# Patient Record
Sex: MALE | Race: OTHER
[De-identification: names, ages, dates, MRNs, and addresses within clinical notes are randomized per-mention and may not be internally consistent; named-entity substitution may affect disease eponyms.]

---

## 2020-02-06 ENCOUNTER — HOSPITAL ENCOUNTER (EMERGENCY)
Dept: HOSPITAL 61 - ER | Age: 30
LOS: 1 days | Discharge: HOME | End: 2020-02-07
Payer: SELF-PAY

## 2020-02-06 VITALS — SYSTOLIC BLOOD PRESSURE: 124 MMHG | DIASTOLIC BLOOD PRESSURE: 71 MMHG

## 2020-02-06 VITALS — WEIGHT: 169.76 LBS | BODY MASS INDEX: 27.28 KG/M2 | HEIGHT: 66 IN

## 2020-02-06 DIAGNOSIS — R41.0: ICD-10-CM

## 2020-02-06 DIAGNOSIS — R42: ICD-10-CM

## 2020-02-06 DIAGNOSIS — Z90.49: ICD-10-CM

## 2020-02-06 DIAGNOSIS — R56.9: Primary | ICD-10-CM

## 2020-02-06 LAB
ALBUMIN SERPL-MCNC: 3.9 G/DL (ref 3.4–5)
ALBUMIN/GLOB SERPL: 0.9 {RATIO} (ref 1–1.7)
ALP SERPL-CCNC: 104 U/L (ref 46–116)
ALT SERPL-CCNC: 77 U/L (ref 16–63)
ANION GAP SERPL CALC-SCNC: 12 MMOL/L (ref 6–14)
AST SERPL-CCNC: 27 U/L (ref 15–37)
BASOPHILS # BLD AUTO: 0.1 X10^3/UL (ref 0–0.2)
BASOPHILS NFR BLD: 1 % (ref 0–3)
BILIRUB SERPL-MCNC: 0.4 MG/DL (ref 0.2–1)
BUN SERPL-MCNC: 12 MG/DL (ref 8–26)
BUN/CREAT SERPL: 12 (ref 6–20)
CALCIUM SERPL-MCNC: 8.6 MG/DL (ref 8.5–10.1)
CHLORIDE SERPL-SCNC: 101 MMOL/L (ref 98–107)
CO2 SERPL-SCNC: 27 MMOL/L (ref 21–32)
CREAT SERPL-MCNC: 1 MG/DL (ref 0.7–1.3)
EOSINOPHIL NFR BLD: 0.1 X10^3/UL (ref 0–0.7)
EOSINOPHIL NFR BLD: 1 % (ref 0–3)
ERYTHROCYTE [DISTWIDTH] IN BLOOD BY AUTOMATED COUNT: 13.6 % (ref 11.5–14.5)
GFR SERPLBLD BASED ON 1.73 SQ M-ARVRAT: 89 ML/MIN
GLOBULIN SER-MCNC: 4.3 G/DL (ref 2.2–3.8)
GLUCOSE SERPL-MCNC: 125 MG/DL (ref 70–99)
HCT VFR BLD CALC: 46.4 % (ref 39–53)
HGB BLD-MCNC: 15.6 G/DL (ref 13–17.5)
LYMPHOCYTES # BLD: 3.1 X10^3/UL (ref 1–4.8)
LYMPHOCYTES NFR BLD AUTO: 25 % (ref 24–48)
MCH RBC QN AUTO: 29 PG (ref 25–35)
MCHC RBC AUTO-ENTMCNC: 34 G/DL (ref 31–37)
MCV RBC AUTO: 85 FL (ref 79–100)
MONO #: 0.8 X10^3/UL (ref 0–1.1)
MONOCYTES NFR BLD: 6 % (ref 0–9)
NEUT #: 8.4 X10^3/UL (ref 1.8–7.7)
NEUTROPHILS NFR BLD AUTO: 68 % (ref 31–73)
PLATELET # BLD AUTO: 296 X10^3/UL (ref 140–400)
POTASSIUM SERPL-SCNC: 3.4 MMOL/L (ref 3.5–5.1)
PROT SERPL-MCNC: 8.2 G/DL (ref 6.4–8.2)
RBC # BLD AUTO: 5.48 X10^6/UL (ref 4.3–5.7)
SODIUM SERPL-SCNC: 140 MMOL/L (ref 136–145)
WBC # BLD AUTO: 12.5 X10^3/UL (ref 4–11)

## 2020-02-06 PROCEDURE — 99285 EMERGENCY DEPT VISIT HI MDM: CPT

## 2020-02-06 PROCEDURE — 80053 COMPREHEN METABOLIC PANEL: CPT

## 2020-02-06 PROCEDURE — 85025 COMPLETE CBC W/AUTO DIFF WBC: CPT

## 2020-02-06 PROCEDURE — 70450 CT HEAD/BRAIN W/O DYE: CPT

## 2020-02-06 PROCEDURE — 83605 ASSAY OF LACTIC ACID: CPT

## 2020-02-06 PROCEDURE — 36415 COLL VENOUS BLD VENIPUNCTURE: CPT

## 2020-02-06 NOTE — PHYS DOC
Past Medical History


Past Medical History:  Seizure


Past Surgical History:  Cholecystectomy


Smoking Status:  Never Smoker


Alcohol Use:  None





Adult General


Chief Complaint


Chief Complaint:  SEIZURE





HPI


HPI





29-year-old male presents to emergency department with a seizure. Patient has no

formal diagnosis of seizure however describes seizure-type activity partially 7 

years ago and never saw physician. Significant other states they were driving 

tonight she was talking to the patient subsequently stopped talking began 

shaking foaming at the mouth. This happened around 8:30 PM. She states he was 

confused afterwards, denies any vomiting nausea. He did complain of some 

dizziness. Patient has been on phentermine a few weeks. She as well describes 

he's been taking Tylenol Motrin for her toothache.  Currently patient is at [facility]. He does describe some tingling of his upper and lower extremities 

however this may be related to phentermine. Patient did bite his tongue.





Review of Systems


Review of Systems





Constitutional: Denies fever or chills []


Respiratory: Denies cough or shortness of breath, bite to tongue []


Cardiovascular: No additional information not addressed in HPI []


GI: Denies abdominal pain, nausea, vomiting, bloody stools or diarrhea []


: Denies dysuria or hematuria []


Musculoskeletal: Denies back pain or joint pain []


Integument: Denies rash or skin lesions []


Neurologic: Denies headache, focal weakness or sensory changes []





All other systems were reviewed and found to be within normal limits, except as 

documented in this note.





Allergies


Allergies





Allergies








Coded Allergies Type Severity Reaction Last Updated Verified


 


  No Known Drug Allergies    20 No











Physical Exam


Physical Exam





Constitutional: Well developed, well nourished, no acute distress, non-toxic 

appearance. []


HENT: Normocephalic, atraumatic, bilateral external ears normal, oropharynx 

moist, evidenceof bite to tongue, no oral exudates, nose normal. []


Eyes: PERRLA, EOMI, conjunctiva normal, no discharge. [] 


Neck: Normal range of motion, no tenderness, supple, no stridor. [] 


Cardiovascular:Heart rate regular rhythm, no murmur []


Lungs & Thorax:  Bilateral breath sounds clear to auscultation []


Abdomen: Bowel sounds normal, soft, no tenderness, no masses, no pulsatile m

asses. [] 


Skin: Warm, dry, no erythema, no rash. [] 


Back: No tenderness, no CVA tenderness. [] 


Extremities: No tenderness, no edema. [] 


Neurologic: Alert and oriented X 3, no focal deficits noted. []


Psychologic: Affect normal, judgement normal, mood normal. []





Current Patient Data


Vital Signs





                                   Vital Signs








  Date Time  Temp Pulse Resp B/P (MAP) Pulse Ox O2 Delivery O2 Flow Rate FiO2


 


20 21:48 97.7 101 24 152/80 (104) 99 Room Air  





 97.7       








Lab Values





                                Laboratory Tests








Test


 20


21:55


 


White Blood Count


 12.5 x10^3/uL


(4.0-11.0)  H


 


Red Blood Count


 5.48 x10^6/uL


(4.30-5.70)


 


Hemoglobin


 15.6 g/dL


(13.0-17.5)


 


Hematocrit


 46.4 %


(39.0-53.0)


 


Mean Corpuscular Volume


 85 fL ()





 


Mean Corpuscular Hemoglobin 29 pg (25-35)  


 


Mean Corpuscular Hemoglobin


Concent 34 g/dL


(31-37)


 


Red Cell Distribution Width


 13.6 %


(11.5-14.5)


 


Platelet Count


 296 x10^3/uL


(140-400)


 


Neutrophils (%) (Auto) 68 % (31-73)  


 


Lymphocytes (%) (Auto) 25 % (24-48)  


 


Monocytes (%) (Auto) 6 % (0-9)  


 


Eosinophils (%) (Auto) 1 % (0-3)  


 


Basophils (%) (Auto) 1 % (0-3)  


 


Neutrophils # (Auto)


 8.4 x10^3/uL


(1.8-7.7)  H


 


Lymphocytes # (Auto)


 3.1 x10^3/uL


(1.0-4.8)


 


Monocytes # (Auto)


 0.8 x10^3/uL


(0.0-1.1)


 


Eosinophils # (Auto)


 0.1 x10^3/uL


(0.0-0.7)


 


Basophils # (Auto)


 0.1 x10^3/uL


(0.0-0.2)


 


Sodium Level


 140 mmol/L


(136-145)


 


Potassium Level


 3.4 mmol/L


(3.5-5.1)  L


 


Chloride Level


 101 mmol/L


()


 


Carbon Dioxide Level


 27 mmol/L


(21-32)


 


Anion Gap 12 (6-14)  


 


Blood Urea Nitrogen


 12 mg/dL


(8-26)


 


Creatinine


 1.0 mg/dL


(0.7-1.3)


 


Estimated GFR


(Cockcroft-Gault) 89.0  





 


BUN/Creatinine Ratio 12 (6-20)  


 


Glucose Level


 125 mg/dL


(70-99)  H


 


Lactic Acid Level


 2.2 mmol/L


(0.4-2.0)  H


 


Calcium Level


 8.6 mg/dL


(8.5-10.1)


 


Total Bilirubin


 0.4 mg/dL


(0.2-1.0)


 


Aspartate Amino Transferase


(AST) 27 U/L (15-37)





 


Alanine Aminotransferase (ALT)


 77 U/L (16-63)


H


 


Alkaline Phosphatase


 104 U/L


()


 


Total Protein


 8.2 g/dL


(6.4-8.2)


 


Albumin


 3.9 g/dL


(3.4-5.0)


 


Albumin/Globulin Ratio


 0.9 (1.0-1.7)


L





                                Laboratory Tests


20 21:55








                                Laboratory Tests


20 21:55











EKG


EKG


[]





Radiology/Procedures


Radiology/Procedures


[facility]


                    [address]  [place], [place] [id number] [phone]


                                        


                                 IMAGING REPORT





                                     Signed





PATIENT: KWAN KAHNCOUNT: [id number]     MRN#: [id number]


: 1990           LOCATION: ER              AGE: 29


SEX: M                    EXAM DT: 20         ACCESSION#: [id number]


STATUS: REG ER            ORD. PHYSICIAN: MADONNA POZO MD


REASON: possible seizure activity/AMS


PROCEDURE: CT HEAD WO CONTRAST





EXAM: CT Head without IV contrast


 


CLINICAL HISTORY: possible seizure activity/AMS


 


COMPARISON: None.


 


TECHNIQUE:


Routine CT of the head without contrast. Soft tissues and bone windows 


were reviewed.


 


PQRS compliance statement - One or more of the following individualized 


dose reduction techniques were utilized for this study:


1.  Automated exposure control


2.  Adjustment of the mA and/or kV according to patient size


3.  Use of iterative reconstruction technique


 


FINDINGS:  


There is no evidence of hemorrhage, mass or extra-axial fluid collection.


 


Grey-white differentiation is maintained with no evidence of edema. 


 


There is no mass effect or shift of the intracranial structures.


 


The ventricles, basilar cisterns and cortical sulci are normal in size and


configuration for the patients stated age.


 


The cerebellum and brainstem are unremarkable.  


 


The calvarium demonstrates no evidence of fracture or focal lesion.


 


There is normal aeration of the visualized paranasal sinuses and mastoid 


air cells.


 


The visualized portions of the orbits are normal.


 


Subcutaneous soft tissue swelling overlying the right posterior parietal 


region.


 


IMPRESSION:


No evidence for acute intracranial process.


 


Electronically signed by: Ish Carpio MD (2020 10:44 PM) [id number]














DICTATED and SIGNED BY:     ISH CARPIO MD


DATE:     20 2244


[]





Course & Med Decision Making


Course & Med Decision Making


Pertinent Labs and Imaging studies reviewed. (See chart for details)





[]29-year-old male presents to emergency department with a seizure. Patient has 

no formal diagnosis of seizure however describes seizure-type activity partially

 7 years ago and never saw physician. Significant other states they were driving

 tonight she was talking to the patient subsequently stopped talking began 

shaking foaming at the mouth. This happened around 8:30 PM. She states he was 

confused afterwards, denies any vomiting nausea. He did complain of some 

dizziness. Patient has been on phentermine a few weeks. She as well describes 

he's been taking Tylenol Motrin for her toothache.  Currently patient is at 

baseline. He does describe some tingling of his upper and lower extremities 

however this may be related to phentermine. Patient did bite his tongue.





Labs reviewed, hypokalemia (replaced in ER), lactic acid 2.2


CT without acute findings


Recommend cessation of phentermine until seen by neurology 


Recommend follow up with neurology as outpatient for further seizure workup


Return precautions provided





Dragon Disclaimer


Dragon Disclaimer


This electronic medical record was generated, in whole or in part, using a voice

 recognition dictation system.





Departure


Departure


Impression:  


   Primary Impression:  


   Seizure-like activity


Disposition:  01 HOME, SELF-CARE


Condition:  IMPROVED


Referrals:  


NO PCP (PCP)








YOSHI MORA MD


Patient Instructions:  Seizure Disorder, Child, Generalized Tonic-Clonic





Additional Instructions:  


Recommend follow up with PCP 3 - 5 days


Return to the ER with worsening symptoms, intractable pain, fever, altered 

mental status


Tylenol/Motrin as needed for pain


CT of head without acute findings


Recommend cessation of phentermine


Recommend follow up with Neurology as an outpatient for further seizure work up











MADONNA POZO MD               2020 22:27

## 2020-02-06 NOTE — RAD
EXAM: CT Head without IV contrast

 

CLINICAL HISTORY: possible seizure activity/AMS

 

COMPARISON: None.

 

TECHNIQUE:

Routine CT of the head without contrast. Soft tissues and bone windows 

were reviewed.

 

PQRS compliance statement - One or more of the following individualized 

dose reduction techniques were utilized for this study:

1.  Automated exposure control

2.  Adjustment of the mA and/or kV according to patient size

3.  Use of iterative reconstruction technique

 

FINDINGS:  

There is no evidence of hemorrhage, mass or extra-axial fluid collection.

 

Grey-white differentiation is maintained with no evidence of edema. 

 

There is no mass effect or shift of the intracranial structures.

 

The ventricles, basilar cisterns and cortical sulci are normal in size and

configuration for the patients stated age.

 

The cerebellum and brainstem are unremarkable.  

 

The calvarium demonstrates no evidence of fracture or focal lesion.

 

There is normal aeration of the visualized paranasal sinuses and mastoid 

air cells.

 

The visualized portions of the orbits are normal.

 

Subcutaneous soft tissue swelling overlying the right posterior parietal 

region.

 

IMPRESSION:

No evidence for acute intracranial process.

 

Electronically signed by: Ish Bianchi MD (2/6/2020 10:44 PM) [id number]